# Patient Record
Sex: MALE
[De-identification: names, ages, dates, MRNs, and addresses within clinical notes are randomized per-mention and may not be internally consistent; named-entity substitution may affect disease eponyms.]

---

## 2019-03-14 ENCOUNTER — HOSPITAL ENCOUNTER (OUTPATIENT)
Dept: HOSPITAL 25 - OREAST | Age: 69
Discharge: HOME | End: 2019-03-14
Attending: ORTHOPAEDIC SURGERY
Payer: MEDICARE

## 2019-03-14 VITALS — DIASTOLIC BLOOD PRESSURE: 76 MMHG | SYSTOLIC BLOOD PRESSURE: 144 MMHG

## 2019-03-14 DIAGNOSIS — Z87.891: ICD-10-CM

## 2019-03-14 DIAGNOSIS — M65.321: Primary | ICD-10-CM

## 2019-03-14 DIAGNOSIS — M65.331: ICD-10-CM

## 2019-03-14 DIAGNOSIS — M19.011: ICD-10-CM

## 2019-03-14 DIAGNOSIS — M65.341: ICD-10-CM

## 2019-03-14 NOTE — OP
DATE OF OPERATION:  03/14/19 [facility]

 

DATE OF BIRTH:  [birth year]

 

SURGEON:  Jaswinder Mooney MD

 

ASSISTANT:  AARON Cormier

 

ANESTHESIOLOGIST:  None.

 

ANESTHESIA:  Local only with 0.25% Marcaine.

 

PRE-OP DIAGNOSIS:  Right index, middle, and ring trigger fingers.

 

POST-OP DIAGNOSIS:  Right index, middle, and ring trigger fingers.

 

OPERATIVE PROCEDURES:

1.  Right index trigger finger release.

2.  Right middle trigger finger release.

3.  Right ring trigger finger release.

 

INDICATIONS:  Jose has some chronic trigger fingers; the worst is the ring 
finger. We talked about risks and benefits.  He wanted to proceed.

 

ESTIMATED BLOOD LOSS:  2 mL.

 

COMPLICATIONS:  None.

 

FINDINGS:  See above and below.

 

DESCRIPTION OF PROCEDURE: Jose was seen in the preoperative holding area. The 
correct side, site, and procedure were identified.  We came back to the 
operating room.  The arm was prepped and draped in the usual fashion.  I had 
already infiltrated the operative area with 0.25% Marcaine in the preoperative 
holding area.  A time-out was performed.

 

The arm was exsanguinated with the Esmarch and the tourniquet was inflated to 
225 mmHg.  I began by making longitudinal incisions over the A1 pulley area of 
the index, middle, and ring fingers.  Dissection was carried down and full-
thickness flaps were bluntly raised off the tendon sheaths of each finger.  I 
then placed the Ragnell retractors in the ring finger wound and protected the 
digital nerves. There was a lot of tenosynovitis.  This was all excised.  I 
then incised the A1 pulley longitudinally along the radial third.  The release 
was completed distally and proximally with the tenotomy scissors.

 

I then in similar fashion completed the A1 pulley release of the middle finger. 
There was less tenosynovitis there.

 

I then moved to the index finger, placed the retractors, and completed the A1 
pulley release in similar fashion.  Once I confirmed that the A1 pulleys were 
released on all 3 fingers, I had him flex and extend the fingers multiple times
, there was no catching.  We irrigated out the wounds.  Skin was closed with 4-
0 nylon suture.  Wounds were dressed with Xeroform, 4x4s, sterile Webril, and 
an Ace bandage.  He was then taken to the recovery room in stable condition.

 

 [id number]/CPS #: [id number]

CANDACE